# Patient Record
Sex: MALE | Race: ASIAN | NOT HISPANIC OR LATINO | URBAN - METROPOLITAN AREA
[De-identification: names, ages, dates, MRNs, and addresses within clinical notes are randomized per-mention and may not be internally consistent; named-entity substitution may affect disease eponyms.]

---

## 2017-09-01 ENCOUNTER — OUTPATIENT (OUTPATIENT)
Dept: OUTPATIENT SERVICES | Facility: HOSPITAL | Age: 42
LOS: 1 days | End: 2017-09-01
Payer: MEDICAID

## 2017-09-01 PROCEDURE — G9001: CPT

## 2017-09-17 ENCOUNTER — EMERGENCY (EMERGENCY)
Facility: HOSPITAL | Age: 42
LOS: 1 days | Discharge: HOME | End: 2017-09-17
Attending: EMERGENCY MEDICINE | Admitting: EMERGENCY MEDICINE
Payer: MEDICAID

## 2017-09-17 VITALS
SYSTOLIC BLOOD PRESSURE: 136 MMHG | HEART RATE: 89 BPM | DIASTOLIC BLOOD PRESSURE: 85 MMHG | TEMPERATURE: 99 F | RESPIRATION RATE: 18 BRPM | OXYGEN SATURATION: 99 %

## 2017-09-17 LAB
ALBUMIN SERPL ELPH-MCNC: 4.7 G/DL — SIGNIFICANT CHANGE UP (ref 3.3–5)
ALP SERPL-CCNC: 58 U/L — SIGNIFICANT CHANGE UP (ref 40–120)
ALT FLD-CCNC: 55 U/L — HIGH (ref 4–41)
APAP SERPL-MCNC: < 15 UG/ML — LOW (ref 15–25)
APPEARANCE UR: CLEAR — SIGNIFICANT CHANGE UP
AST SERPL-CCNC: 26 U/L — SIGNIFICANT CHANGE UP (ref 4–40)
BACTERIA # UR AUTO: SIGNIFICANT CHANGE UP
BARBITURATES MEASUREMENT: NEGATIVE — SIGNIFICANT CHANGE UP
BASE EXCESS BLDV CALC-SCNC: 3.1 MMOL/L — SIGNIFICANT CHANGE UP
BASOPHILS # BLD AUTO: 0.04 K/UL — SIGNIFICANT CHANGE UP (ref 0–0.2)
BASOPHILS NFR BLD AUTO: 0.5 % — SIGNIFICANT CHANGE UP (ref 0–2)
BENZODIAZ SERPL-MCNC: NEGATIVE — SIGNIFICANT CHANGE UP
BILIRUB SERPL-MCNC: 0.2 MG/DL — SIGNIFICANT CHANGE UP (ref 0.2–1.2)
BILIRUB UR-MCNC: NEGATIVE — SIGNIFICANT CHANGE UP
BLOOD GAS VENOUS - CREATININE: 0.86 MG/DL — SIGNIFICANT CHANGE UP (ref 0.5–1.3)
BLOOD UR QL VISUAL: NEGATIVE — SIGNIFICANT CHANGE UP
BUN SERPL-MCNC: 9 MG/DL — SIGNIFICANT CHANGE UP (ref 7–23)
CALCIUM SERPL-MCNC: 9.4 MG/DL — SIGNIFICANT CHANGE UP (ref 8.4–10.5)
CHLORIDE BLDV-SCNC: 105 MMOL/L — SIGNIFICANT CHANGE UP (ref 96–108)
CHLORIDE SERPL-SCNC: 102 MMOL/L — SIGNIFICANT CHANGE UP (ref 98–107)
CO2 SERPL-SCNC: 27 MMOL/L — SIGNIFICANT CHANGE UP (ref 22–31)
COLOR SPEC: SIGNIFICANT CHANGE UP
CREAT SERPL-MCNC: 0.91 MG/DL — SIGNIFICANT CHANGE UP (ref 0.5–1.3)
EOSINOPHIL # BLD AUTO: 0.37 K/UL — SIGNIFICANT CHANGE UP (ref 0–0.5)
EOSINOPHIL NFR BLD AUTO: 4.3 % — SIGNIFICANT CHANGE UP (ref 0–6)
ETHANOL BLD-MCNC: < 10 MG/DL — SIGNIFICANT CHANGE UP
GAS PNL BLDV: 140 MMOL/L — SIGNIFICANT CHANGE UP (ref 136–146)
GLUCOSE BLDV-MCNC: 105 — HIGH (ref 70–99)
GLUCOSE SERPL-MCNC: 98 MG/DL — SIGNIFICANT CHANGE UP (ref 70–99)
GLUCOSE UR-MCNC: NEGATIVE — SIGNIFICANT CHANGE UP
HCO3 BLDV-SCNC: 25 MMOL/L — SIGNIFICANT CHANGE UP (ref 20–27)
HCT VFR BLD CALC: 41.2 % — SIGNIFICANT CHANGE UP (ref 39–50)
HCT VFR BLDV CALC: 46.3 % — SIGNIFICANT CHANGE UP (ref 39–51)
HGB BLD-MCNC: 14.5 G/DL — SIGNIFICANT CHANGE UP (ref 13–17)
HGB BLDV-MCNC: 15.1 G/DL — SIGNIFICANT CHANGE UP (ref 13–17)
IMM GRANULOCYTES # BLD AUTO: 0.01 # — SIGNIFICANT CHANGE UP
IMM GRANULOCYTES NFR BLD AUTO: 0.1 % — SIGNIFICANT CHANGE UP (ref 0–1.5)
KETONES UR-MCNC: NEGATIVE — SIGNIFICANT CHANGE UP
LACTATE BLDV-MCNC: 1.4 MMOL/L — SIGNIFICANT CHANGE UP (ref 0.5–2)
LEUKOCYTE ESTERASE UR-ACNC: NEGATIVE — SIGNIFICANT CHANGE UP
LYMPHOCYTES # BLD AUTO: 4.02 K/UL — HIGH (ref 1–3.3)
LYMPHOCYTES # BLD AUTO: 47.2 % — HIGH (ref 13–44)
MCHC RBC-ENTMCNC: 31 PG — SIGNIFICANT CHANGE UP (ref 27–34)
MCHC RBC-ENTMCNC: 35.2 % — SIGNIFICANT CHANGE UP (ref 32–36)
MCV RBC AUTO: 88 FL — SIGNIFICANT CHANGE UP (ref 80–100)
MONOCYTES # BLD AUTO: 0.55 K/UL — SIGNIFICANT CHANGE UP (ref 0–0.9)
MONOCYTES NFR BLD AUTO: 6.5 % — SIGNIFICANT CHANGE UP (ref 2–14)
NEUTROPHILS # BLD AUTO: 3.53 K/UL — SIGNIFICANT CHANGE UP (ref 1.8–7.4)
NEUTROPHILS NFR BLD AUTO: 41.4 % — LOW (ref 43–77)
NITRITE UR-MCNC: NEGATIVE — SIGNIFICANT CHANGE UP
NRBC # FLD: 0 — SIGNIFICANT CHANGE UP
PCO2 BLDV: 52 MMHG — HIGH (ref 41–51)
PH BLDV: 7.36 PH — SIGNIFICANT CHANGE UP (ref 7.32–7.43)
PH UR: 7 — SIGNIFICANT CHANGE UP (ref 4.6–8)
PLATELET # BLD AUTO: 218 K/UL — SIGNIFICANT CHANGE UP (ref 150–400)
PMV BLD: 10.8 FL — SIGNIFICANT CHANGE UP (ref 7–13)
PO2 BLDV: 35 MMHG — SIGNIFICANT CHANGE UP (ref 35–40)
POTASSIUM BLDV-SCNC: 4.1 MMOL/L — SIGNIFICANT CHANGE UP (ref 3.4–4.5)
POTASSIUM SERPL-MCNC: 4.2 MMOL/L — SIGNIFICANT CHANGE UP (ref 3.5–5.3)
POTASSIUM SERPL-SCNC: 4.2 MMOL/L — SIGNIFICANT CHANGE UP (ref 3.5–5.3)
PROT SERPL-MCNC: 7.3 G/DL — SIGNIFICANT CHANGE UP (ref 6–8.3)
PROT UR-MCNC: NEGATIVE — SIGNIFICANT CHANGE UP
RBC # BLD: 4.68 M/UL — SIGNIFICANT CHANGE UP (ref 4.2–5.8)
RBC # FLD: 11.9 % — SIGNIFICANT CHANGE UP (ref 10.3–14.5)
SALICYLATES SERPL-MCNC: < 5 MG/DL — LOW (ref 15–30)
SAO2 % BLDV: 63.2 % — SIGNIFICANT CHANGE UP (ref 60–85)
SODIUM SERPL-SCNC: 141 MMOL/L — SIGNIFICANT CHANGE UP (ref 135–145)
SP GR SPEC: 1 — LOW (ref 1–1.03)
TSH SERPL-MCNC: 2.02 UIU/ML — SIGNIFICANT CHANGE UP (ref 0.27–4.2)
UROBILINOGEN FLD QL: NORMAL E.U. — SIGNIFICANT CHANGE UP (ref 0.1–0.2)
WBC # BLD: 8.52 K/UL — SIGNIFICANT CHANGE UP (ref 3.8–10.5)
WBC # FLD AUTO: 8.52 K/UL — SIGNIFICANT CHANGE UP (ref 3.8–10.5)

## 2017-09-17 PROCEDURE — 99285 EMERGENCY DEPT VISIT HI MDM: CPT

## 2017-09-17 RX ORDER — MECLIZINE HCL 12.5 MG
25 TABLET ORAL ONCE
Qty: 0 | Refills: 0 | Status: COMPLETED | OUTPATIENT
Start: 2017-09-17 | End: 2017-09-17

## 2017-09-17 RX ORDER — SODIUM CHLORIDE 9 MG/ML
1000 INJECTION INTRAMUSCULAR; INTRAVENOUS; SUBCUTANEOUS ONCE
Qty: 0 | Refills: 0 | Status: COMPLETED | OUTPATIENT
Start: 2017-09-17 | End: 2017-09-17

## 2017-09-17 RX ORDER — MECLIZINE HCL 12.5 MG
1 TABLET ORAL
Qty: 30 | Refills: 0
Start: 2017-09-17

## 2017-09-17 RX ADMIN — SODIUM CHLORIDE 2000 MILLILITER(S): 9 INJECTION INTRAMUSCULAR; INTRAVENOUS; SUBCUTANEOUS at 21:53

## 2017-09-17 RX ADMIN — Medication 25 MILLIGRAM(S): at 22:24

## 2017-09-17 RX ADMIN — SODIUM CHLORIDE 2000 MILLILITER(S): 9 INJECTION INTRAMUSCULAR; INTRAVENOUS; SUBCUTANEOUS at 21:52

## 2017-09-17 NOTE — ED PROVIDER NOTE - OBJECTIVE STATEMENT
21:54 Amalia att: 21:54 Amalia att: 42M h/o hld, dm c/o weak and head spinning x 2 wks. One month ago patient self-discontinued his metformin FS . Past two weeks generalized weakness, max temp 98F, sneezing, rhinorrhea, generalized shakiness. Yesterday room spinning, bilateral ear buzzing sound. Today head "spinning too much inside...all my body leg and hand no energy to hold up. I felt paralyzed." Assoc diaphoresis, unsteady gait. Denies cp, sob, exertional sx.  PMH hld, dm, "either lung or kidney infection from eating a lot of mangoes" PSH x MED [facility] 21:54 Amalia att: 42M h/o hld, dm c/o weak and head spinning x 2 wks. One month ago patient self-discontinued his metformin FS . Past two weeks generalized weakness, max temp 98F, sneezing, rhinorrhea, generalized shakiness. Yesterday room spinning, bilateral ear buzzing sound. Today head "spinning too much inside...all my body leg and hand no energy to hold up. I felt paralyzed." Assoc diaphoresis, unsteady gait. Denies cp, sob, exertional sx.  PMH hld, dm, "either lung or kidney infection from eating a lot of mangoes" PSH x MED metformin 500 mg, lisinopril 2.5mg, asa 81 qd, vit D, b12, calcium ALL nkda SMOKE occ PCP [facility] [id number] 21:54 Amalia att: 42M h/o hld, dm c/o weak and head spinning x 2 wks. One month ago patient self-discontinued his metformin FS . Past two weeks generalized weakness, max temp 98F, sneezing, rhinorrhea, generalized shakiness. Yesterday room spinning, bilateral ear buzzing sound. Today head "spinning too much inside...all my body leg and hand no energy to hold up. I felt paralyzed." Assoc diaphoresis, unsteady gait. Denies cp, sob, exertional sx.  PMH hld, dm, "either lung or kidney infection from eating a lot of mangoes" PSH x MED metformin 500 mg, lisinopril 2.5mg, asa 81 qd, vit D, b12, calcium ALL nkda SMOKE occ PCP [facility] [id number]    22:11 Seamus PA: 41 y/o M w/ PMHx of htn, hld, dm (on metformin), GERD, presents to ED c/o dizziness, generalized fatigue and chills x1wk. States no documented fever at home but was taking Tylenol around the clock for the past x3days. States his PMD told him to hold on his home medications in case he starts feeling ill. Also c/o ringing in the lateral ears. States he works as an [employer]  and notices dizziness more when turning his head while driving. Sometimes feels he is about to get pushed backwards. Denies SOB, CP, cough, nausea, vomiting, abdominal pain, dysuria, diarrhea, or any other complaints. 21:54 Amalia att: 42M h/o hld, dm c/o weak and head spinning x 2 wks. One month ago patient self-discontinued his metformin FS . Past two weeks generalized weakness, max temp 98F, sneezing, rhinorrhea, generalized shakiness. Yesterday room spinning, bilateral ear buzzing sound. Today head "spinning too much inside...all my body leg and hand no energy to hold up. I felt paralyzed." Assoc diaphoresis, unsteady gait. Denies cp, sob, exertional sx.  PMH hld, dm, "either lung or kidney infection from eating a lot of mangoes" PSH x MED metformin 500 mg, lisinopril 2.5mg, asa 81 qd, vit D, b12, calcium ALL nkda SMOKE occ PCP [facility] [id number]    MEHUL Goldbergov: 43 y/o M w/ PMHx of htn, hld, dm (on metformin), GERD, presents to ED c/o dizziness, generalized fatigue and chills x1wk. States no documented fever at home but was taking Tylenol around the clock for the past x3days. Also c/o ringing in bilateral ears. States he works as an [employer]  and notices dizziness more when turning his head while driving. Sometimes feels he is about to get pushed backwards. Denies SOB, CP, cough, nausea, vomiting, abdominal pain, dysuria, diarrhea, or any other complaints.

## 2017-09-17 NOTE — ED PROVIDER NOTE - PHYSICAL EXAMINATION
Amalia att: nad, aaox3, perrl, cn2-12 intact, ncat, neck supple, 80% perforation of left TM with no active drainage or redness of ear canal, ctabl, rrr, s1s2, abd soft ntnd. 2 Beats horizontal nystagmus elicited with rightward gaze, not reproduced on further exam. Nl finger nose. Nl rapid alt movement. Nl heel shin. Nl gait steady and unassisted. Neg romberg. Amalia att: nad, aaox3, perrl, cn2-12 intact, ncat, neck supple, 80% perforation of left TM with no active drainage or redness of ear canal, ctabl, rrr, s1s2, abd soft ntnd. 2 Beats horizontal nystagmus elicited with rightward gaze, not reproduced on further exam. Nl finger nose. Nl rapid alt movement. Nl heel shin. Nl gait steady and unassisted. Neg romberg.    Neg [place] Hallpike

## 2017-09-17 NOTE — ED PROVIDER NOTE - ATTENDING CONTRIBUTION TO CARE
Dr. Holland: I performed a face to face bedside interview with patient regarding history of present illness, review of symptoms and past medical history. I completed an independent physical exam.  I have discussed patient's plan of care with PA.   I agree with note as stated above, having amended the EMR as needed to reflect my findings.   This includes HISTORY OF PRESENT ILLNESS, HIV, PAST MEDICAL/SURGICAL/FAMILY/SOCIAL HISTORY, ALLERGIES AND HOME MEDICATIONS, REVIEW OF SYSTEMS, PHYSICAL EXAM, and any PROGRESS NOTES during the time I functioned as the attending physician for this patient. HPI above as by me. PE above as by me. Generalized weakness plus ?vertigo. PE intact cranial and cerebellar nerves, ruptured lt TM. PLAN meclizine, fluids, cbc diff r/o anemia, cmp eval for electrolyte disturbance, tsh, ua, ucx, vbg eval for dka

## 2017-09-17 NOTE — ED PROVIDER NOTE - PMH
No pertinent past medical history DM (diabetes mellitus)    HLD (hyperlipidemia)    HTN (hypertension)

## 2017-09-17 NOTE — ED PROVIDER NOTE - PROGRESS NOTE DETAILS
MEHUL Way: Pt reassessed, feeling better, improved after Meclizine. Labs wnl. Will dc w/ ENT follow up

## 2017-09-17 NOTE — ED PROVIDER NOTE - PLAN OF CARE
See your primary care doctor within 24-48 hours. See an ENT doctor within the week (referral list provided). Take Meclizine 25mg every 8 hours as needed for dizziness. DO NOT USE Q-TIPS OR PUT WATER INTO YOUR EAR. If symptoms worsen or if you develop vision changes, have extremity numbness/tingling or any other concerns, return to the ER.

## 2017-09-17 NOTE — ED PROVIDER NOTE - CARE PLAN
Principal Discharge DX:	Vertigo  Instructions for follow-up, activity and diet:	See your primary care doctor within 24-48 hours. See an ENT doctor within the week (referral list provided). Take Meclizine 25mg every 8 hours as needed for dizziness. DO NOT USE Q-TIPS OR PUT WATER INTO YOUR EAR. If symptoms worsen or if you develop vision changes, have extremity numbness/tingling or any other concerns, return to the ER.  Secondary Diagnosis:	Rupture of tympanic membrane, left

## 2017-09-17 NOTE — ED ADULT TRIAGE NOTE - CHIEF COMPLAINT QUOTE
Patient  has c/o his whole body is shaking, head is spinning, feeling feverish, itchy throat. Theses symptoms started a couple of days ago.

## 2017-09-17 NOTE — ED PROVIDER NOTE - NEUROLOGICAL, MLM
Alert and oriented, no focal deficits, no motor or sensory deficits. Negative Gor[place] Hallpike maneuver.

## 2017-09-17 NOTE — ED PROVIDER NOTE - MEDICAL DECISION MAKING DETAILS
43 y/o M w/ generalized fatigue and malaise, nonfocal exam findings, differential dx questionable labyrinthitis secondary to recent viral URI and vertigo now, due to absence of horizontal gaze nystagmus and negative [place] Hallpike will consider CT head. 41 y/o M w/ generalized fatigue and malaise w/ vertigo,  perforated L TM likely the cause of pts vertigo. Will do labs and trail meclizine

## 2017-09-19 DIAGNOSIS — R69 ILLNESS, UNSPECIFIED: ICD-10-CM

## 2017-09-19 LAB
BACTERIA UR CULT: SIGNIFICANT CHANGE UP
SPECIMEN SOURCE: SIGNIFICANT CHANGE UP

## 2017-12-03 ENCOUNTER — EMERGENCY (EMERGENCY)
Facility: HOSPITAL | Age: 42
LOS: 1 days | Discharge: HOME | End: 2017-12-03
Admitting: EMERGENCY MEDICINE
Payer: COMMERCIAL

## 2017-12-03 VITALS
RESPIRATION RATE: 16 BRPM | TEMPERATURE: 98 F | DIASTOLIC BLOOD PRESSURE: 95 MMHG | HEART RATE: 97 BPM | SYSTOLIC BLOOD PRESSURE: 153 MMHG | OXYGEN SATURATION: 100 %

## 2017-12-03 PROCEDURE — 99285 EMERGENCY DEPT VISIT HI MDM: CPT

## 2017-12-03 RX ORDER — ACETAMINOPHEN 500 MG
650 TABLET ORAL ONCE
Qty: 0 | Refills: 0 | Status: COMPLETED | OUTPATIENT
Start: 2017-12-03 | End: 2017-12-03

## 2017-12-03 RX ORDER — IBUPROFEN 200 MG
600 TABLET ORAL ONCE
Qty: 0 | Refills: 0 | Status: COMPLETED | OUTPATIENT
Start: 2017-12-03 | End: 2017-12-03

## 2017-12-03 RX ADMIN — Medication 650 MILLIGRAM(S): at 23:40

## 2017-12-03 RX ADMIN — Medication 600 MILLIGRAM(S): at 23:40

## 2017-12-03 NOTE — ED PROVIDER NOTE - PLAN OF CARE
Follow with your PMD within 48-72 hours.  Rest, no heavy lifting.  Warm compresses to area. Light walking. Take Motrin 600 mg every 8 hours for pain with food, Valium 5mg every 8 hours as needed for muscle spasm- caution drowsiness/do not drive. Any worsening pain, weakness, numbness, bowel or urinary incontinence return to ER.
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
## 2017-12-03 NOTE — ED PROVIDER NOTE - PROGRESS NOTE DETAILS
MEHUL Soto: xray negative. pt pain improved but still has some pain. Pt walking without difficulty. Pt to be dced with valium and Motrin.
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
## 2017-12-03 NOTE — ED PROVIDER NOTE - CHPI ED SYMPTOMS NEG
no laceration/no dizziness/no disorientation/no loss of consciousness/no bruising/no difficulty bearing weight

## 2017-12-04 PROCEDURE — 72100 X-RAY EXAM L-S SPINE 2/3 VWS: CPT | Mod: 26

## 2017-12-04 RX ORDER — DIAZEPAM 5 MG
1 TABLET ORAL
Qty: 9 | Refills: 0
Start: 2017-12-04 | End: 2017-12-07

## 2017-12-04 NOTE — ED ADULT NURSE REASSESSMENT NOTE - NS ED NURSE REASSESS COMMENT FT1
Pt admitted to surgery, no bed assignment at this time, pt endorsed to TREVON Welsh in intake in stable condition for continuity of care.

## 2019-07-01 ENCOUNTER — OUTPATIENT (OUTPATIENT)
Dept: OUTPATIENT SERVICES | Facility: HOSPITAL | Age: 44
LOS: 1 days | End: 2019-07-01
Payer: MEDICAID

## 2019-07-01 PROCEDURE — G9001: CPT

## 2019-07-23 ENCOUNTER — EMERGENCY (EMERGENCY)
Facility: HOSPITAL | Age: 44
LOS: 1 days | Discharge: HOME | End: 2019-07-23
Attending: EMERGENCY MEDICINE | Admitting: EMERGENCY MEDICINE
Payer: MEDICAID

## 2019-07-23 VITALS
RESPIRATION RATE: 16 BRPM | SYSTOLIC BLOOD PRESSURE: 120 MMHG | OXYGEN SATURATION: 100 % | HEART RATE: 65 BPM | DIASTOLIC BLOOD PRESSURE: 57 MMHG

## 2019-07-23 VITALS
DIASTOLIC BLOOD PRESSURE: 95 MMHG | SYSTOLIC BLOOD PRESSURE: 154 MMHG | HEART RATE: 89 BPM | TEMPERATURE: 98 F | OXYGEN SATURATION: 100 %

## 2019-07-23 PROBLEM — E78.5 HYPERLIPIDEMIA, UNSPECIFIED: Chronic | Status: ACTIVE | Noted: 2017-09-17

## 2019-07-23 PROBLEM — E11.9 TYPE 2 DIABETES MELLITUS WITHOUT COMPLICATIONS: Chronic | Status: ACTIVE | Noted: 2017-09-17

## 2019-07-23 PROBLEM — I10 ESSENTIAL (PRIMARY) HYPERTENSION: Chronic | Status: ACTIVE | Noted: 2017-09-17

## 2019-07-23 PROCEDURE — 73110 X-RAY EXAM OF WRIST: CPT | Mod: 26,LT,RT

## 2019-07-23 PROCEDURE — 99283 EMERGENCY DEPT VISIT LOW MDM: CPT

## 2019-07-23 RX ORDER — ACETAMINOPHEN 500 MG
650 TABLET ORAL ONCE
Refills: 0 | Status: COMPLETED | OUTPATIENT
Start: 2019-07-23 | End: 2019-07-23

## 2019-07-23 RX ORDER — IBUPROFEN 200 MG
1 TABLET ORAL
Qty: 28 | Refills: 0
Start: 2019-07-23 | End: 2019-07-29

## 2019-07-23 RX ORDER — IBUPROFEN 200 MG
600 TABLET ORAL ONCE
Refills: 0 | Status: COMPLETED | OUTPATIENT
Start: 2019-07-23 | End: 2019-07-23

## 2019-07-23 RX ADMIN — Medication 600 MILLIGRAM(S): at 13:39

## 2019-07-23 RX ADMIN — Medication 650 MILLIGRAM(S): at 13:39
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
## 2019-07-23 NOTE — ED PROVIDER NOTE - NSFOLLOWUPINSTRUCTIONS_ED_ALL_ED_FT
-Follow-up with your Primary Care Doctor in 1-2 days.  -Return to the Emergency Department for any worsening or concerning symptoms such as fevers, severe pain, trouble breathing, weakness or lightheadedness.  You can take 650mg of acetaminophen every 4-6hrs as needed for pain. Do not take more than 3250mg per day.   Can use 400mg Ibuprofen every 4 to 6 hours for pain control as needed. Do not take more than 3200mg per day. Take ibuprofen with food. Review all warning labels before taking.

## 2019-07-23 NOTE — ED PROVIDER NOTE - PROGRESS NOTE DETAILS
Xrs negative for fx. All results d/w patient and copies given with instructions to bring with them to their follow up appointment.  The patient was given verbal and written discharge instructions Specifically, instructions when to return to the ED and to seek follow-up from their pcp within 1-2 days. The patient understands that should their symptoms worsen or any new symptoms arise, they should return to the ED immediately for further evaluation.  Vss, NAD, tolerating po and ambulating steadily at discharge.
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
## 2019-07-23 NOTE — ED PROVIDER NOTE - NSFOLLOWUPCLINICS_GEN_ALL_ED_FT
[facility] Orthopedic Surgery  Orthopedic Surgery  [address], 3rd & 4th [place] [address]  Phone: [phone]  Fax:   Follow Up Time:

## 2019-07-23 NOTE — ED PROVIDER NOTE - PHYSICAL EXAMINATION
Gen: No acute distress, alert, cooperative  HENT: Normocephalic, atraumatic.   Eyes: PERRL, EOMI    Resp: CTAB, non-labored, speaking without difficulty on room air, no wheeze  CV: rrr, no murmur  Abd: soft, NTND, no rebound or guarding  MSK: No CVAT bilaterally, no midline ttp, mild soreness SHA paraspinal neck, no pain with neck rom, tender SHA dorsal wrist in the middle. No snuffbox tenderness SHA, no pain with thumb grinding SHA, no finger tenderness, no MCP tenderness SHA. NOrmal rom throughout  Skin: warm and dry  Neuro: AOx3, speech is fluent and appropriate, no focal deficits, normal gait.   Psych: Normal affect

## 2019-07-24 DIAGNOSIS — Z71.89 OTHER SPECIFIED COUNSELING: ICD-10-CM

## 2019-11-25 ENCOUNTER — EMERGENCY (EMERGENCY)
Facility: HOSPITAL | Age: 44
LOS: 1 days | Discharge: HOME | End: 2019-11-25
Admitting: EMERGENCY MEDICINE
Payer: COMMERCIAL

## 2019-11-25 VITALS
RESPIRATION RATE: 18 BRPM | HEART RATE: 68 BPM | TEMPERATURE: 98 F | DIASTOLIC BLOOD PRESSURE: 78 MMHG | OXYGEN SATURATION: 98 % | SYSTOLIC BLOOD PRESSURE: 117 MMHG

## 2019-11-25 VITALS
RESPIRATION RATE: 16 BRPM | HEART RATE: 78 BPM | DIASTOLIC BLOOD PRESSURE: 83 MMHG | OXYGEN SATURATION: 98 % | SYSTOLIC BLOOD PRESSURE: 119 MMHG | TEMPERATURE: 98 F

## 2019-11-25 PROCEDURE — 70450 CT HEAD/BRAIN W/O DYE: CPT | Mod: 26

## 2019-11-25 PROCEDURE — 99284 EMERGENCY DEPT VISIT MOD MDM: CPT

## 2019-11-25 RX ORDER — LIDOCAINE 4 G/100G
2 CREAM TOPICAL ONCE
Refills: 0 | Status: COMPLETED | OUTPATIENT
Start: 2019-11-25 | End: 2019-11-25

## 2019-11-25 RX ORDER — KETOROLAC TROMETHAMINE 30 MG/ML
30 SYRINGE (ML) INJECTION ONCE
Refills: 0 | Status: DISCONTINUED | OUTPATIENT
Start: 2019-11-25 | End: 2019-11-25

## 2019-11-25 RX ADMIN — Medication 30 MILLIGRAM(S): at 19:48

## 2019-11-25 RX ADMIN — LIDOCAINE 2 PATCH: 4 CREAM TOPICAL at 19:48

## 2019-11-25 NOTE — ED PROVIDER NOTE - PATIENT PORTAL LINK FT
You can access the FollowMyHealth Patient Portal offered by [facility] by registering at the following website: [email or web address]. By joining [facility]’s FollowMyHealth portal, you will also be able to view your health information using other applications (apps) compatible with our system.
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
## 2022-07-29 ENCOUNTER — INPATIENT (INPATIENT)
Facility: HOSPITAL | Age: 47
LOS: 0 days | Discharge: HOME | End: 2022-07-30
Attending: STUDENT IN AN ORGANIZED HEALTH CARE EDUCATION/TRAINING PROGRAM | Admitting: STUDENT IN AN ORGANIZED HEALTH CARE EDUCATION/TRAINING PROGRAM

## 2022-07-29 VITALS
HEART RATE: 81 BPM | DIASTOLIC BLOOD PRESSURE: 72 MMHG | SYSTOLIC BLOOD PRESSURE: 107 MMHG | RESPIRATION RATE: 16 BRPM | TEMPERATURE: 97 F | OXYGEN SATURATION: 100 %

## 2022-07-29 LAB
ALBUMIN SERPL ELPH-MCNC: 4.5 G/DL — SIGNIFICANT CHANGE UP (ref 3.3–5)
ALP SERPL-CCNC: 53 U/L — SIGNIFICANT CHANGE UP (ref 40–120)
ALT FLD-CCNC: 12 U/L — SIGNIFICANT CHANGE UP (ref 4–41)
ANION GAP SERPL CALC-SCNC: 11 MMOL/L — SIGNIFICANT CHANGE UP (ref 7–14)
APTT BLD: 31.4 SEC — SIGNIFICANT CHANGE UP (ref 27–36.3)
AST SERPL-CCNC: 16 U/L — SIGNIFICANT CHANGE UP (ref 4–40)
BASOPHILS # BLD AUTO: 0.04 K/UL — SIGNIFICANT CHANGE UP (ref 0–0.2)
BASOPHILS NFR BLD AUTO: 0.3 % — SIGNIFICANT CHANGE UP (ref 0–2)
BILIRUB SERPL-MCNC: 0.4 MG/DL — SIGNIFICANT CHANGE UP (ref 0.2–1.2)
BUN SERPL-MCNC: 10 MG/DL — SIGNIFICANT CHANGE UP (ref 7–23)
CALCIUM SERPL-MCNC: 9 MG/DL — SIGNIFICANT CHANGE UP (ref 8.4–10.5)
CHLORIDE SERPL-SCNC: 100 MMOL/L — SIGNIFICANT CHANGE UP (ref 98–107)
CO2 SERPL-SCNC: 26 MMOL/L — SIGNIFICANT CHANGE UP (ref 22–31)
CREAT SERPL-MCNC: 0.89 MG/DL — SIGNIFICANT CHANGE UP (ref 0.5–1.3)
CRP SERPL-MCNC: <3 MG/L — SIGNIFICANT CHANGE UP
EGFR: 108 ML/MIN/1.73M2 — SIGNIFICANT CHANGE UP
EOSINOPHIL # BLD AUTO: 0.19 K/UL — SIGNIFICANT CHANGE UP (ref 0–0.5)
EOSINOPHIL NFR BLD AUTO: 1.6 % — SIGNIFICANT CHANGE UP (ref 0–6)
ERYTHROCYTE [SEDIMENTATION RATE] IN BLOOD: 5 MM/HR — SIGNIFICANT CHANGE UP (ref 1–15)
FLUAV AG NPH QL: SIGNIFICANT CHANGE UP
FLUBV AG NPH QL: SIGNIFICANT CHANGE UP
GLUCOSE SERPL-MCNC: 88 MG/DL — SIGNIFICANT CHANGE UP (ref 70–99)
HCT VFR BLD CALC: 40.6 % — SIGNIFICANT CHANGE UP (ref 39–50)
HGB BLD-MCNC: 13.8 G/DL — SIGNIFICANT CHANGE UP (ref 13–17)
IANC: 8.7 K/UL — HIGH (ref 1.8–7.4)
IMM GRANULOCYTES NFR BLD AUTO: 0.3 % — SIGNIFICANT CHANGE UP (ref 0–1.5)
INR BLD: 1.01 RATIO — SIGNIFICANT CHANGE UP (ref 0.88–1.16)
LYMPHOCYTES # BLD AUTO: 18.9 % — SIGNIFICANT CHANGE UP (ref 13–44)
LYMPHOCYTES # BLD AUTO: 2.25 K/UL — SIGNIFICANT CHANGE UP (ref 1–3.3)
MAGNESIUM SERPL-MCNC: 1.9 MG/DL — SIGNIFICANT CHANGE UP (ref 1.6–2.6)
MCHC RBC-ENTMCNC: 30.7 PG — SIGNIFICANT CHANGE UP (ref 27–34)
MCHC RBC-ENTMCNC: 34 GM/DL — SIGNIFICANT CHANGE UP (ref 32–36)
MCV RBC AUTO: 90.2 FL — SIGNIFICANT CHANGE UP (ref 80–100)
MONOCYTES # BLD AUTO: 0.69 K/UL — SIGNIFICANT CHANGE UP (ref 0–0.9)
MONOCYTES NFR BLD AUTO: 5.8 % — SIGNIFICANT CHANGE UP (ref 2–14)
NEUTROPHILS # BLD AUTO: 8.7 K/UL — HIGH (ref 1.8–7.4)
NEUTROPHILS NFR BLD AUTO: 73.1 % — SIGNIFICANT CHANGE UP (ref 43–77)
NRBC # BLD: 0 /100 WBCS — SIGNIFICANT CHANGE UP
NRBC # FLD: 0 K/UL — SIGNIFICANT CHANGE UP
NT-PROBNP SERPL-SCNC: <5 PG/ML — SIGNIFICANT CHANGE UP
PLATELET # BLD AUTO: 197 K/UL — SIGNIFICANT CHANGE UP (ref 150–400)
POTASSIUM SERPL-MCNC: 3.8 MMOL/L — SIGNIFICANT CHANGE UP (ref 3.5–5.3)
POTASSIUM SERPL-SCNC: 3.8 MMOL/L — SIGNIFICANT CHANGE UP (ref 3.5–5.3)
PROCALCITONIN SERPL-MCNC: 0.05 NG/ML — SIGNIFICANT CHANGE UP (ref 0.02–0.1)
PROT SERPL-MCNC: 7 G/DL — SIGNIFICANT CHANGE UP (ref 6–8.3)
PROTHROM AB SERPL-ACNC: 11.7 SEC — SIGNIFICANT CHANGE UP (ref 10.5–13.4)
RBC # BLD: 4.5 M/UL — SIGNIFICANT CHANGE UP (ref 4.2–5.8)
RBC # FLD: 12 % — SIGNIFICANT CHANGE UP (ref 10.3–14.5)
RSV RNA NPH QL NAA+NON-PROBE: SIGNIFICANT CHANGE UP
SARS-COV-2 RNA SPEC QL NAA+PROBE: SIGNIFICANT CHANGE UP
SODIUM SERPL-SCNC: 137 MMOL/L — SIGNIFICANT CHANGE UP (ref 135–145)
TROPONIN T, HIGH SENSITIVITY RESULT: <6 NG/L — SIGNIFICANT CHANGE UP
WBC # BLD: 11.91 K/UL — HIGH (ref 3.8–10.5)
WBC # FLD AUTO: 11.91 K/UL — HIGH (ref 3.8–10.5)

## 2022-07-29 PROCEDURE — 99285 EMERGENCY DEPT VISIT HI MDM: CPT

## 2022-07-29 PROCEDURE — 70496 CT ANGIOGRAPHY HEAD: CPT | Mod: 26,MA

## 2022-07-29 PROCEDURE — 70498 CT ANGIOGRAPHY NECK: CPT | Mod: 26,MA

## 2022-07-29 PROCEDURE — 71046 X-RAY EXAM CHEST 2 VIEWS: CPT | Mod: 26

## 2022-07-29 RX ORDER — MORPHINE SULFATE 50 MG/1
4 CAPSULE, EXTENDED RELEASE ORAL ONCE
Refills: 0 | Status: DISCONTINUED | OUTPATIENT
Start: 2022-07-29 | End: 2022-07-29

## 2022-07-29 RX ORDER — METOCLOPRAMIDE HCL 10 MG
10 TABLET ORAL ONCE
Refills: 0 | Status: COMPLETED | OUTPATIENT
Start: 2022-07-29 | End: 2022-07-29

## 2022-07-29 RX ORDER — SODIUM CHLORIDE 9 MG/ML
1000 INJECTION INTRAMUSCULAR; INTRAVENOUS; SUBCUTANEOUS ONCE
Refills: 0 | Status: COMPLETED | OUTPATIENT
Start: 2022-07-29 | End: 2022-07-29

## 2022-07-29 RX ADMIN — MORPHINE SULFATE 4 MILLIGRAM(S): 50 CAPSULE, EXTENDED RELEASE ORAL at 22:04

## 2022-07-29 RX ADMIN — SODIUM CHLORIDE 1000 MILLILITER(S): 9 INJECTION INTRAMUSCULAR; INTRAVENOUS; SUBCUTANEOUS at 22:05

## 2022-07-29 RX ADMIN — Medication 10 MILLIGRAM(S): at 22:05

## 2022-07-29 NOTE — ED ADULT TRIAGE NOTE - CHIEF COMPLAINT QUOTE
Pt c/o CP and SOB beginning this afternoon. Pt respirations even and unlabored. Reports CP radiates to neck and head. PMHx: DM, FS:111.

## 2022-07-29 NOTE — ED ADULT NURSE NOTE - OBJECTIVE STATEMENT
Patient received with the complaints of chest pain and sob started this afternoon. As per patient, his pain is radiating to neck and head. Patient is placed on cardiac monitor. No distress noted. Nursing care continues

## 2022-07-29 NOTE — ED PROVIDER NOTE - OBJECTIVE STATEMENT
47 Y M H/O HTN, HCL, NIDDM, fhx early MI <55, presenting with chest pain. States pain began at ~16:00, mid chest radiating up to R. chest, Denies any nausea, vomiting, abdominal pain, back pain, difficulty breathing.

## 2022-07-29 NOTE — ED ADULT TRIAGE NOTE - RESPIRATORY RATE (BREATHS/MIN)
CT ABDOMEN AND PELVIS WITHOUT CONTRAST:

 

Date:  10/05/2020

 

HISTORY:  

33-year-old male with abdominal pain, mainly in the left lower quadrant. 

 

COMPARISON: 

None. 

 

FINDINGS:

Absence of oral and IV contrast reduces the sensitivity of exam, particularly for evaluation of solid
 organs and bowel. 

 

The lung bases are clear. No free air or free fluid is seen in the abdomen or pelvis. A small hiatal 
hernia is seen.  

 

Numerous cysts are seen in the liver, the largest measuring up to 3.0 cm in the right lobe. No calcif
ied gallstones are noted. 

 

There is a 3.0 cm calculus in the superior pole of the right kidney. There is a 4.0 mm calculus in th
e distal left ureter close to the UVJ with mild ureteral dilatation, but no hydronephrosis. No calcul
i seen in the left kidney, right ureter, or the urinary bladder. No hydroureteronephrosis seen on eit
her side. There is a 2.5 cm cyst in the left kidney. There are low density lesions in the right kidne
y with a 1.3 cm exophytic lesion arising from the inferior pole. These do not meet all criteria for a
 simple cyst.

 

IMPRESSION: 

1.  4.0 mm left distal ureteric calculus. 

 

2.  Nonobstructing 3.0 mm right renal calculus. 

 

3.  Left renal cyst. 

 

4.  Probable cysts in the right kidney. Confirmation with ultrasound of the abdomen is recommended. 

 

5.  Liver cysts. 

 

6.  Small hiatal hernia. 

 

 

POS: OFF 16

## 2022-07-29 NOTE — ED PROVIDER NOTE - CLINICAL SUMMARY MEDICAL DECISION MAKING FREE TEXT BOX
47 Y M H/O HTN, HCL, DM, FHx early MI, recent smoking, presenting with chest pain. Likely high risk chest pain, low clinical suspicion PE WELLS SCORE (no clinical signs of dvt, no PE#1 dx, no HR>100, no immob>3days/surg in last 4 wks, no prev DVT/PE, no hemoptysis, no malignancy), in setting of neck pain/temporal pain will work up for carotid dissection vs. temporal arteritis. common labs, trop, bnp, CTA neck/head, re-assess, likely admit for high risk chest pain.

## 2022-07-29 NOTE — ED PROVIDER NOTE - PROGRESS NOTE DETAILS
Ubaldo Rabago MD:  Trop negative, discussed with Dr. Falk, high risk chest pain admission to Tele for first workup with stress test, cardiology evaluation.

## 2022-07-29 NOTE — ED PROVIDER NOTE - ATTENDING CONTRIBUTION TO CARE
DR. EVERETT, ATTENDING MD-  I performed a face to face bedside interview with the patient regarding history of present illness, review of symptoms and past medical history. I completed an independent physical exam.  I have discussed the patient's plan of care with the resident.   Documentation as above in the note.    46 y/o male h/o smoking early fam h/o cardiac disease here with cp sob x3pm.  Additional c/o ha.  Eval for acs ptx vasc dissection.  Obtain cbc cmp trop ekg cta h/n cxr admit for further care and evaluation.

## 2022-07-30 ENCOUNTER — TRANSCRIPTION ENCOUNTER (OUTPATIENT)
Age: 47
End: 2022-07-30

## 2022-07-30 VITALS
HEART RATE: 67 BPM | RESPIRATION RATE: 15 BRPM | OXYGEN SATURATION: 98 % | TEMPERATURE: 98 F | SYSTOLIC BLOOD PRESSURE: 111 MMHG | DIASTOLIC BLOOD PRESSURE: 63 MMHG

## 2022-07-30 DIAGNOSIS — I10 ESSENTIAL (PRIMARY) HYPERTENSION: ICD-10-CM

## 2022-07-30 DIAGNOSIS — R07.9 CHEST PAIN, UNSPECIFIED: ICD-10-CM

## 2022-07-30 DIAGNOSIS — E78.5 HYPERLIPIDEMIA, UNSPECIFIED: ICD-10-CM

## 2022-07-30 DIAGNOSIS — E03.9 HYPOTHYROIDISM, UNSPECIFIED: ICD-10-CM

## 2022-07-30 DIAGNOSIS — E11.9 TYPE 2 DIABETES MELLITUS WITHOUT COMPLICATIONS: ICD-10-CM

## 2022-07-30 LAB
A1C WITH ESTIMATED AVERAGE GLUCOSE RESULT: 5.7 % — HIGH (ref 4–5.6)
ANION GAP SERPL CALC-SCNC: 10 MMOL/L — SIGNIFICANT CHANGE UP (ref 7–14)
BASOPHILS # BLD AUTO: 0.02 K/UL — SIGNIFICANT CHANGE UP (ref 0–0.2)
BASOPHILS NFR BLD AUTO: 0.2 % — SIGNIFICANT CHANGE UP (ref 0–2)
BUN SERPL-MCNC: 9 MG/DL — SIGNIFICANT CHANGE UP (ref 7–23)
CALCIUM SERPL-MCNC: 8.2 MG/DL — LOW (ref 8.4–10.5)
CHLORIDE SERPL-SCNC: 106 MMOL/L — SIGNIFICANT CHANGE UP (ref 98–107)
CHOLEST SERPL-MCNC: 169 MG/DL — SIGNIFICANT CHANGE UP
CO2 SERPL-SCNC: 25 MMOL/L — SIGNIFICANT CHANGE UP (ref 22–31)
CREAT SERPL-MCNC: 0.81 MG/DL — SIGNIFICANT CHANGE UP (ref 0.5–1.3)
D DIMER BLD IA.RAPID-MCNC: <150 NG/ML DDU — SIGNIFICANT CHANGE UP
EGFR: 111 ML/MIN/1.73M2 — SIGNIFICANT CHANGE UP
EOSINOPHIL # BLD AUTO: 0.21 K/UL — SIGNIFICANT CHANGE UP (ref 0–0.5)
EOSINOPHIL NFR BLD AUTO: 2.5 % — SIGNIFICANT CHANGE UP (ref 0–6)
ESTIMATED AVERAGE GLUCOSE: 117 — SIGNIFICANT CHANGE UP
GLUCOSE BLDC GLUCOMTR-MCNC: 176 MG/DL — HIGH (ref 70–99)
GLUCOSE BLDC GLUCOMTR-MCNC: 86 MG/DL — SIGNIFICANT CHANGE UP (ref 70–99)
GLUCOSE BLDC GLUCOMTR-MCNC: 95 MG/DL — SIGNIFICANT CHANGE UP (ref 70–99)
GLUCOSE BLDC GLUCOMTR-MCNC: 99 MG/DL — SIGNIFICANT CHANGE UP (ref 70–99)
GLUCOSE SERPL-MCNC: 94 MG/DL — SIGNIFICANT CHANGE UP (ref 70–99)
HCT VFR BLD CALC: 36.9 % — LOW (ref 39–50)
HDLC SERPL-MCNC: 40 MG/DL — LOW
HGB BLD-MCNC: 12.4 G/DL — LOW (ref 13–17)
IANC: 4.59 K/UL — SIGNIFICANT CHANGE UP (ref 1.8–7.4)
IMM GRANULOCYTES NFR BLD AUTO: 0.2 % — SIGNIFICANT CHANGE UP (ref 0–1.5)
LIPID PNL WITH DIRECT LDL SERPL: 109 MG/DL — HIGH
LYMPHOCYTES # BLD AUTO: 2.78 K/UL — SIGNIFICANT CHANGE UP (ref 1–3.3)
LYMPHOCYTES # BLD AUTO: 33.3 % — SIGNIFICANT CHANGE UP (ref 13–44)
MCHC RBC-ENTMCNC: 30.5 PG — SIGNIFICANT CHANGE UP (ref 27–34)
MCHC RBC-ENTMCNC: 33.6 GM/DL — SIGNIFICANT CHANGE UP (ref 32–36)
MCV RBC AUTO: 90.9 FL — SIGNIFICANT CHANGE UP (ref 80–100)
MONOCYTES # BLD AUTO: 0.72 K/UL — SIGNIFICANT CHANGE UP (ref 0–0.9)
MONOCYTES NFR BLD AUTO: 8.6 % — SIGNIFICANT CHANGE UP (ref 2–14)
NEUTROPHILS # BLD AUTO: 4.59 K/UL — SIGNIFICANT CHANGE UP (ref 1.8–7.4)
NEUTROPHILS NFR BLD AUTO: 55.2 % — SIGNIFICANT CHANGE UP (ref 43–77)
NON HDL CHOLESTEROL: 129 MG/DL — SIGNIFICANT CHANGE UP
NRBC # BLD: 0 /100 WBCS — SIGNIFICANT CHANGE UP
NRBC # FLD: 0 K/UL — SIGNIFICANT CHANGE UP
PLATELET # BLD AUTO: 177 K/UL — SIGNIFICANT CHANGE UP (ref 150–400)
POTASSIUM SERPL-MCNC: 3.8 MMOL/L — SIGNIFICANT CHANGE UP (ref 3.5–5.3)
POTASSIUM SERPL-SCNC: 3.8 MMOL/L — SIGNIFICANT CHANGE UP (ref 3.5–5.3)
RBC # BLD: 4.06 M/UL — LOW (ref 4.2–5.8)
RBC # FLD: 12 % — SIGNIFICANT CHANGE UP (ref 10.3–14.5)
SODIUM SERPL-SCNC: 141 MMOL/L — SIGNIFICANT CHANGE UP (ref 135–145)
TRIGL SERPL-MCNC: 100 MG/DL — SIGNIFICANT CHANGE UP
TROPONIN T, HIGH SENSITIVITY RESULT: <6 NG/L — SIGNIFICANT CHANGE UP
WBC # BLD: 8.34 K/UL — SIGNIFICANT CHANGE UP (ref 3.8–10.5)
WBC # FLD AUTO: 8.34 K/UL — SIGNIFICANT CHANGE UP (ref 3.8–10.5)

## 2022-07-30 PROCEDURE — 99223 1ST HOSP IP/OBS HIGH 75: CPT

## 2022-07-30 PROCEDURE — 12345: CPT | Mod: NC

## 2022-07-30 RX ORDER — ACETAMINOPHEN 500 MG
2 TABLET ORAL
Qty: 0 | Refills: 0 | DISCHARGE
Start: 2022-07-30

## 2022-07-30 RX ORDER — ASPIRIN/CALCIUM CARB/MAGNESIUM 324 MG
81 TABLET ORAL DAILY
Refills: 0 | Status: DISCONTINUED | OUTPATIENT
Start: 2022-07-30 | End: 2022-07-30

## 2022-07-30 RX ORDER — LEVOTHYROXINE SODIUM 125 MCG
0 TABLET ORAL
Qty: 0 | Refills: 0 | DISCHARGE

## 2022-07-30 RX ORDER — LANOLIN ALCOHOL/MO/W.PET/CERES
3 CREAM (GRAM) TOPICAL AT BEDTIME
Refills: 0 | Status: DISCONTINUED | OUTPATIENT
Start: 2022-07-30 | End: 2022-07-30

## 2022-07-30 RX ORDER — ACETAMINOPHEN 500 MG
650 TABLET ORAL EVERY 6 HOURS
Refills: 0 | Status: DISCONTINUED | OUTPATIENT
Start: 2022-07-30 | End: 2022-07-30

## 2022-07-30 RX ORDER — ENOXAPARIN SODIUM 100 MG/ML
40 INJECTION SUBCUTANEOUS EVERY 24 HOURS
Refills: 0 | Status: DISCONTINUED | OUTPATIENT
Start: 2022-07-30 | End: 2022-07-30

## 2022-07-30 RX ORDER — METFORMIN HYDROCHLORIDE 850 MG/1
0 TABLET ORAL
Qty: 0 | Refills: 0 | DISCHARGE

## 2022-07-30 RX ORDER — ATORVASTATIN CALCIUM 80 MG/1
40 TABLET, FILM COATED ORAL AT BEDTIME
Refills: 0 | Status: DISCONTINUED | OUTPATIENT
Start: 2022-07-30 | End: 2022-07-30

## 2022-07-30 RX ORDER — SODIUM CHLORIDE 9 MG/ML
1000 INJECTION, SOLUTION INTRAVENOUS
Refills: 0 | Status: DISCONTINUED | OUTPATIENT
Start: 2022-07-30 | End: 2022-07-30

## 2022-07-30 RX ORDER — DEXTROSE 50 % IN WATER 50 %
25 SYRINGE (ML) INTRAVENOUS ONCE
Refills: 0 | Status: DISCONTINUED | OUTPATIENT
Start: 2022-07-30 | End: 2022-07-30

## 2022-07-30 RX ORDER — LISINOPRIL 2.5 MG/1
0 TABLET ORAL
Qty: 0 | Refills: 0 | DISCHARGE

## 2022-07-30 RX ORDER — ONDANSETRON 8 MG/1
4 TABLET, FILM COATED ORAL EVERY 8 HOURS
Refills: 0 | Status: DISCONTINUED | OUTPATIENT
Start: 2022-07-30 | End: 2022-07-30

## 2022-07-30 RX ORDER — ATORVASTATIN CALCIUM 80 MG/1
1 TABLET, FILM COATED ORAL
Qty: 30 | Refills: 0
Start: 2022-07-30 | End: 2022-08-28

## 2022-07-30 RX ORDER — GLUCAGON INJECTION, SOLUTION 0.5 MG/.1ML
1 INJECTION, SOLUTION SUBCUTANEOUS ONCE
Refills: 0 | Status: DISCONTINUED | OUTPATIENT
Start: 2022-07-30 | End: 2022-07-30

## 2022-07-30 RX ORDER — INSULIN LISPRO 100/ML
VIAL (ML) SUBCUTANEOUS
Refills: 0 | Status: DISCONTINUED | OUTPATIENT
Start: 2022-07-30 | End: 2022-07-30

## 2022-07-30 RX ORDER — ASPIRIN/CALCIUM CARB/MAGNESIUM 324 MG
162 TABLET ORAL ONCE
Refills: 0 | Status: COMPLETED | OUTPATIENT
Start: 2022-07-30 | End: 2022-07-30

## 2022-07-30 RX ORDER — INSULIN LISPRO 100/ML
VIAL (ML) SUBCUTANEOUS AT BEDTIME
Refills: 0 | Status: DISCONTINUED | OUTPATIENT
Start: 2022-07-30 | End: 2022-07-30

## 2022-07-30 RX ORDER — LISINOPRIL 2.5 MG/1
2.5 TABLET ORAL DAILY
Refills: 0 | Status: DISCONTINUED | OUTPATIENT
Start: 2022-07-30 | End: 2022-07-30

## 2022-07-30 RX ORDER — LEVOTHYROXINE SODIUM 125 MCG
25 TABLET ORAL DAILY
Refills: 0 | Status: DISCONTINUED | OUTPATIENT
Start: 2022-07-30 | End: 2022-07-30

## 2022-07-30 RX ORDER — ATORVASTATIN CALCIUM 80 MG/1
0 TABLET, FILM COATED ORAL
Qty: 0 | Refills: 0 | DISCHARGE

## 2022-07-30 RX ORDER — METFORMIN HYDROCHLORIDE 850 MG/1
1 TABLET ORAL
Qty: 0 | Refills: 0 | DISCHARGE

## 2022-07-30 RX ORDER — DEXTROSE 50 % IN WATER 50 %
12.5 SYRINGE (ML) INTRAVENOUS ONCE
Refills: 0 | Status: DISCONTINUED | OUTPATIENT
Start: 2022-07-30 | End: 2022-07-30

## 2022-07-30 RX ORDER — DEXTROSE 50 % IN WATER 50 %
15 SYRINGE (ML) INTRAVENOUS ONCE
Refills: 0 | Status: DISCONTINUED | OUTPATIENT
Start: 2022-07-30 | End: 2022-07-30

## 2022-07-30 RX ADMIN — Medication 81 MILLIGRAM(S): at 12:33

## 2022-07-30 RX ADMIN — Medication 25 MICROGRAM(S): at 06:51

## 2022-07-30 RX ADMIN — ENOXAPARIN SODIUM 40 MILLIGRAM(S): 100 INJECTION SUBCUTANEOUS at 06:51

## 2022-07-30 RX ADMIN — Medication 1: at 12:30

## 2022-07-30 RX ADMIN — Medication 162 MILLIGRAM(S): at 02:15

## 2022-07-30 NOTE — DISCHARGE NOTE PROVIDER - NSDCMRMEDTOKEN_GEN_ALL_CORE_FT
acetaminophen 325 mg oral tablet: 2 tab(s) orally every 6 hours as needed for pain  LEVOTHYROXINE SODIUM 25MCG TAB:   LISINOPRIL 2.5MG TAB:   METFORMIN HCL 1,000 MG TABLET: 1 each orally once a day   acetaminophen 325 mg oral tablet: 2 tab(s) orally every 6 hours as needed for pain  atorvastatin 40 mg oral tablet: 1 tab(s) orally once a day (at bedtime)  LEVOTHYROXINE SODIUM 25MCG TAB:   LISINOPRIL 2.5MG TAB:   METFORMIN HCL 1,000 MG TABLET: 1 each orally once a day

## 2022-07-30 NOTE — H&P ADULT - NSHPLABSRESULTS_GEN_ALL_CORE
labs reviewed                        13.8   11.91 )-----------( 197      ( 29 Jul 2022 22:30 )             40.6       07-29    137  |  100  |  10  ----------------------------<  88  3.8   |  26  |  0.89    Ca    9.0      29 Jul 2022 22:30  Mg     1.90     07-29    TPro  7.0  /  Alb  4.5  /  TBili  0.4  /  DBili  x   /  AST  16  /  ALT  12  /  AlkPhos  53  07-29        PT/INR - ( 29 Jul 2022 22:30 )   PT: 11.7 sec;   INR: 1.01 ratio         PTT - ( 29 Jul 2022 22:30 )  PTT:31.4 sec    EKG interpreted by myself: NSR with TWI in V1  CXR interpreted by myself: clear lungs  Images interpreted by radiology:   CT brain: No acute intracranial hemorrhage, mass effect, or midline shift.    CTA neck and brain: No large vessel occlusion, hemodynamically significantly stenosis, dissection, or aneurysm.

## 2022-07-30 NOTE — CONSULT NOTE ADULT - ASSESSMENT
Patient is a 47 year old male with PMH of HTN, HLD, DM, presents with right sided pain that radiated to head and then to chest associated with SOB.    PLAN:  EKG with no signs of acute ischemia  Troponin is negative x 2  Chest pain atypical  No further reports of SOB or pain  Pain relieved with morphine  Recommend outpatient follow up with cardiologist in the office for further cardiac work up.  Discussed this case with Dr. Hubert Gonsalez

## 2022-07-30 NOTE — ED ADULT NURSE REASSESSMENT NOTE - NS ED NURSE REASSESS COMMENT FT1
report received from overnight RN. A&Ox4. ambulatory. NAD. pt denies SOB, dizziness, weakness, urinary symptoms, HA, n/v/d, fevers, chills. respirations are even and un labored. skin intact. safety precautions maintained. pt on cardiac monitor, sinus isabel. safety precautions maintained.

## 2022-07-30 NOTE — DISCHARGE NOTE NURSING/CASE MANAGEMENT/SOCIAL WORK - NSDCPEFALRISK_GEN_ALL_CORE
For information on Fall & Injury Prevention, visit: [email or web address] OR  [email or web address] OR  https://www.cdc.gov/steadi/patient.html

## 2022-07-30 NOTE — H&P ADULT - HISTORY OF PRESENT ILLNESS
47 yr old male with a pmh of HTN, HLD, T2DM not on insulin who presents with chest pain that started at ~3:20 pm. Reports the "clicking pain" started on the right side in his neck and traveled both up his head as well as into the right side of his chest. This was associated with SOB.  Reports the pain was 10/10 and has improved with the morphine.   Reports extensive family history of MI, HTN, T2DM.  He has been under more stress at home lately/ has an increased work load as he has a  at home that he has been taking care of as well as his wife who is sick.   Denies  headache, dizziness, palpitations,  abdominal pain, joint pain, diarrhea/constipation, urinary symptoms.   Vitals: T 98.2, HR 72, BP 97/62, RR 15 satting 100% RA

## 2022-07-30 NOTE — H&P ADULT - PROBLEM SELECTOR PLAN 1
New  - EKG nonischemic and troponin <6  - FLAKO score 1, HEART score 4  * repeat troponin  * monitor on telemetry  * cardiology consult in AM   -Start aspirin and increase atorvastatin to 40mg daily

## 2022-07-30 NOTE — DISCHARGE NOTE PROVIDER - NSDCCPCAREPLAN_GEN_ALL_CORE_FT
PRINCIPAL DISCHARGE DIAGNOSIS  Diagnosis: Chest pain  Assessment and Plan of Treatment: You were admitted to the hospital with chest pain.  Your EKG was normal; your bloodwork showed no signs of heart attack or blood clots. Your pain is likely musculoskeletal in nature, take Tylenol 650 mg every 6 hours as needed for pain.  You were seen by the cardiology team who recommends outpatient follow up with cardiology for further workup; call [phone] to schedule appointment.  Follow up with your primary care doctor within 1 week of discharge.      SECONDARY DISCHARGE DIAGNOSES  Diagnosis: Benign essential HTN  Assessment and Plan of Treatment: You have a history of high blood pressure.  Please continue medications as currently prescribed.  Please continue low salt, low cholesterol (DASH) diet. Follow up with your primary care doctor for further management.    Diagnosis: HLD (hyperlipidemia)  Assessment and Plan of Treatment: You have a history of high cholesterol.  Your ATORVASTATIN dose was INCREASED.  Please continue medications as currently prescribed.  Please continue low salt, low cholesterol (DASH) diet. Follow up with your primary care doctor for further management.    Diagnosis: Hypothyroid  Assessment and Plan of Treatment: You have a history of abnormal thyroid function.  Your TSH was 2.22.  Please continue your Synthroid as currently prescribed.  Follow up with your primary care doctor for further management.    Diagnosis: T2DM (type 2 diabetes mellitus)  Assessment and Plan of Treatment: Your A1C was 5.7.  Continue your medication regimen and a consistent carbohydrate diet (Meaning eating the same amount of carbohydrates at the same time each day). Monitor blood glucose levels throughout the day before meals and at bedtime. Record blood sugars and bring to outpatient providers appointment in order to be reviewed by your doctor for management modifications. If your sugars are more than 400 or less than 70 you should contact your PCP immediately. Monitor for signs/symptoms of low blood glucose, such as, dizziness, altered mental status, or cool/clammy skin. In addition, monitor for signs/symptoms of high blood glucose, such as, feeling hot, dry, fatigued, or with increased thirst/urination. Make regular podiatry appointments in order to have feet checked for wounds and uncontrolled toe nail growth to prevent infections, as well as, appointments with an ophthalmologist to monitor your vision.

## 2022-07-30 NOTE — ED ADULT NURSE REASSESSMENT NOTE - NS ED NURSE REASSESS COMMENT FT1
report given to CDU TREVON Stewart. BELINDA. pt denies SOB, chest pain, dizziness, weakness, urinary symptoms, HA, n/v/d, fevers, chills. A&Ox4. respirations are even and un labored. safety precautions maintained.

## 2022-07-30 NOTE — PROGRESS NOTE ADULT - SUBJECTIVE AND OBJECTIVE BOX
[facility] Division of Hospital Medicine  Maria Elena Arauz MD  Pager (M-F, 8A-5P): [phone]  Other Times:  [phone]    Patient is a 47y old  Male who presents with a chief complaint of chest pain (30 Jul 2022 03:30)      SUBJECTIVE / OVERNIGHT EVENTS: still with pleuritic CP on inspiration. headache now resolved. tele NSR  ADDITIONAL REVIEW OF SYSTEMS: denies SOB/N/V     MEDICATIONS  (STANDING):  aspirin  chewable 81 milliGRAM(s) Oral daily  atorvastatin 40 milliGRAM(s) Oral at bedtime  dextrose 5%. 1000 milliLiter(s) (50 mL/Hr) IV Continuous <Continuous>  dextrose 5%. 1000 milliLiter(s) (100 mL/Hr) IV Continuous <Continuous>  dextrose 50% Injectable 25 Gram(s) IV Push once  dextrose 50% Injectable 12.5 Gram(s) IV Push once  dextrose 50% Injectable 25 Gram(s) IV Push once  enoxaparin Injectable 40 milliGRAM(s) SubCutaneous every 24 hours  glucagon  Injectable 1 milliGRAM(s) IntraMuscular once  insulin lispro (ADMELOG) corrective regimen sliding scale   SubCutaneous three times a day before meals  insulin lispro (ADMELOG) corrective regimen sliding scale   SubCutaneous at bedtime  levothyroxine 25 MICROGram(s) Oral daily  lisinopril 2.5 milliGRAM(s) Oral daily    MEDICATIONS  (PRN):  acetaminophen     Tablet .. 650 milliGRAM(s) Oral every 6 hours PRN Temp greater or equal to 38C (100.4F), Mild Pain (1 - 3)  aluminum hydroxide/magnesium hydroxide/simethicone Suspension 30 milliLiter(s) Oral every 4 hours PRN Dyspepsia  dextrose Oral Gel 15 Gram(s) Oral once PRN Blood Glucose LESS THAN 70 milliGRAM(s)/deciliter  melatonin 3 milliGRAM(s) Oral at bedtime PRN Insomnia  ondansetron Injectable 4 milliGRAM(s) IV Push every 8 hours PRN Nausea and/or Vomiting      CAPILLARY BLOOD GLUCOSE      POCT Blood Glucose.: 176 mg/dL (30 Jul 2022 12:18)  POCT Blood Glucose.: 99 mg/dL (30 Jul 2022 08:40)  POCT Blood Glucose.: 95 mg/dL (30 Jul 2022 06:36)  POCT Blood Glucose.: 111 mg/dL (29 Jul 2022 19:26)    I&O's Summary      PHYSICAL EXAM:  Vital Signs Last 24 Hrs  T(C): 36.6 (30 Jul 2022 10:21), Max: 36.8 (29 Jul 2022 23:52)  T(F): 97.8 (30 Jul 2022 10:21), Max: 98.2 (29 Jul 2022 23:52)  HR: 67 (30 Jul 2022 10:21) (64 - 81)  BP: 111/63 (30 Jul 2022 10:21) (93/64 - 111/63)  BP(mean): --  RR: 15 (30 Jul 2022 10:21) (15 - 16)  SpO2: 98% (30 Jul 2022 10:21) (98% - 100%)    Parameters below as of 30 Jul 2022 10:21  Patient On (Oxygen Delivery Method): room air      CONSTITUTIONAL: NAD  EYES: PERRLA; conjunctiva and sclera clear  ENMT: Moist oral mucosa, no pharyngeal injection or exudates; normal dentition  NECK: Supple, no palpable masses; no thyromegaly  RESPIRATORY: Normal respiratory effort; lungs are clear to auscultation bilaterally  CARDIOVASCULAR: Regular rate and rhythm, normal S1 and S2, no murmur/rub/gallop; No lower extremity edema; Peripheral pulses are 2+ bilaterally  ABDOMEN: Nontender to palpation, normoactive bowel sounds, no rebound/guarding; No hepatosplenomegaly  MUSCULOSKELETAL:  Normal gait; no clubbing or cyanosis of digits; no joint swelling or tenderness to palpation  PSYCH: A+O to person, place, and time; affect appropriate  NEUROLOGY: CN 2-12 are intact and symmetric; no gross sensory deficits       LABS:                        12.4   8.34  )-----------( 177      ( 30 Jul 2022 06:13 )             36.9     07-30    141  |  106  |  9   ----------------------------<  94  3.8   |  25  |  0.81    Ca    8.2<L>      30 Jul 2022 06:13  Mg     1.90     07-29    TPro  7.0  /  Alb  4.5  /  TBili  0.4  /  DBili  x   /  AST  16  /  ALT  12  /  AlkPhos  53  07-29    PT/INR - ( 29 Jul 2022 22:30 )   PT: 11.7 sec;   INR: 1.01 ratio         PTT - ( 29 Jul 2022 22:30 )  PTT:31.4 sec            RADIOLOGY & ADDITIONAL TESTS:  Results Reviewed: ct< from: CT Angio Head w/ IV Cont (07.29.22 @ 23:37) >  IMPRESSION:    CT brain: No acute intracranial hemorrhage, mass effect, or midline shift.    CTA neck and brain: No large vessel occlusion, hemodynamically   significantly stenosis, dissection, or aneurysm.    --- End of Report ---    < end of copied text >  < from: CT Angio Neck w/ IV Cont (07.29.22 @ 23:38) >  IMPRESSION:    CT brain: No acute intracranial hemorrhage, mass effect, or midline shift.    CTA neck and brain: No large vessel occlusion, hemodynamically   significantly stenosis, dissection, or aneurysm.    --- End of Report ---      < end of copied text >    Imaging Personally Reviewed:  Electrocardiogram Personally Reviewed:    COORDINATION OF CARE:  Care Discussed with Consultants/Other Providers [Y/N]:  Prior or Outpatient Records Reviewed [Y/N]:

## 2022-07-30 NOTE — H&P ADULT - NSHPREVIEWOFSYSTEMS_GEN_ALL_CORE
REVIEW OF SYSTEMS:    CONSTITUTIONAL: No weakness, fevers or chills  EYES/ENT: No visual changes;  No dysphagia; No sore throat; No rhinorrhea; No sinus pain/pressure  NECK: No pain or stiffness  RESPIRATORY: No cough, wheezing, hemoptysis; + shortness of breath  CARDIOVASCULAR: + chest pain no  palpitations; No lower extremity edema  GASTROINTESTINAL: No abdominal or epigastric pain. No nausea, vomiting, or hematemesis; No diarrhea or constipation. No melena or hematochezia.  GENITOURINARY: No dysuria, frequency or hematuria  NEUROLOGICAL: No numbness or weakness  MSK: ambulates without assistance   SKIN: No itching, burning, rashes, or lesions   All other review of systems is negative unless indicated above.

## 2022-07-30 NOTE — DISCHARGE NOTE NURSING/CASE MANAGEMENT/SOCIAL WORK - NSDCFUADDAPPT_GEN_ALL_CORE_FT
Follow up with your primary care doctor within one week of discharge. If you do not have one, you can call the medicine clinic at [phone] to make an appointment.    Follow up with Cardiology, Dr. Gonsalez, within 1-2 weeks of discharge, please call [phone] to schedule appointment.

## 2022-07-30 NOTE — DISCHARGE NOTE PROVIDER - CARE PROVIDER_API CALL
Your Primary Care Doctor,   Phone: (   )    -  Fax: (   )    -  Follow Up Time:     Hubert Gonsalez (MD; PhD)  Cardiology; Internal Medicine; Vascular Medicine  [id number] [place] [address], [address]  [place] [address]  Phone: [phone]  Fax: [phone]  Follow Up Time:

## 2022-07-30 NOTE — H&P ADULT - PROBLEM SELECTOR PLAN 3
Chronic stable  In setting of chest pain will increase atorvastatin to 40mg daily whilst awaiting above results

## 2022-07-30 NOTE — DISCHARGE NOTE PROVIDER - HOSPITAL COURSE
46 yo M w/ hx DM p/w chest pain.  EKG nonischemic and troponin <6 x 2, no events on telemetry, D-Dimer <150. Cardiology consulted, recommends outpatient follow up with cardiologist in the office for further cardiac work up. Statin increased at discharge, no need for ASA at this time given no history of CAD.  Pain likely MSK in nature, Tylenol PRN for pain.  Follow up with PCP and Cardiology outpatient.    Patient seen and evaluated. Reviewed discharge medications with patient and attending. All new medications requiring new prescriptions were sent to the pharmacy of patient's choice. Reviewed need for prescription for previous home medications and new prescriptions sent if requested. Medically cleared/stable for discharge as per Dr. Arauz on 6/30/2022 with appropriate follow up. Patient understands and agrees with plan of care.

## 2022-07-30 NOTE — DISCHARGE NOTE PROVIDER - PROVIDER TOKENS
FREE:[LAST:[Your Primary Care Doctor],PHONE:[(   )    -],FAX:[(   )    -]],PROVIDER:[TOKEN:[[id number]]]

## 2022-07-30 NOTE — DISCHARGE NOTE PROVIDER - CARE PROVIDERS DIRECT ADDRESSES
,DirectAddress_Unknown,[email or web address]

## 2022-07-30 NOTE — PROGRESS NOTE ADULT - PROBLEM SELECTOR PLAN 1
- EKG nonischemic and troponin <6 x 2   - ASA/statin  * monitor on telemetry  * cardiology consult   - check D-dimer   - check TTE

## 2022-07-30 NOTE — DISCHARGE NOTE NURSING/CASE MANAGEMENT/SOCIAL WORK - PATIENT PORTAL LINK FT
You can access the FollowMyHealth Patient Portal offered by [facility] by registering at the following website: [email or web address]. By joining [employer]s FollowMyHealth portal, you will also be able to view your health information using other applications (apps) compatible with our system.

## 2022-07-30 NOTE — DISCHARGE NOTE PROVIDER - NSDCFUADDAPPT_GEN_ALL_CORE_FT
Follow up with your primary care doctor within one week of discharge. If you do not have one, you can call the medicine clinic at [phone] to make an appointment.    Follow up with Cardiology, Dr. Gonsalez, within 1-2 weeks of discharge, please call [phone] to schedule appointment.

## 2022-07-30 NOTE — CONSULT NOTE ADULT - NS ATTEND AMEND GEN_ALL_CORE FT
Patient seen and examined during morning rounds.  Assessment and recommendations were reviewed with the CCU NP, and as outlined above.

## 2022-07-30 NOTE — CONSULT NOTE ADULT - SUBJECTIVE AND OBJECTIVE BOX
Date of Admission:  22  CHIEF COMPLAINT:  right-sided chest pain  HISTORY OF PRESENT ILLNESS:  Patient is a 47 year old male with PMH of HTN, HLD, DM, +family history of CAD and MI, presents with right sided chest pain.  Patient described a clicking pain" started on the right side in his neck and traveled both up his head as well as into the right side of his chest. This was associated with SOB.  Reports the pain was 10/10 and has improved with the morphine.     He has been under more stress at home lately/ has an increased work load as he has a  at home that he has been taking care of as well as his wife who is sick.   Denies  headache, dizziness, palpitations,  abdominal pain, joint pain, diarrhea/constipation, urinary symptoms.       Cardiology consulted for chest pain.    Allergies    No Known Allergies    Intolerances    	    MEDICATIONS:  aspirin  chewable 81 milliGRAM(s) Oral daily  enoxaparin Injectable 40 milliGRAM(s) SubCutaneous every 24 hours  lisinopril 2.5 milliGRAM(s) Oral daily  acetaminophen     Tablet .. 650 milliGRAM(s) Oral every 6 hours PRN  melatonin 3 milliGRAM(s) Oral at bedtime PRN  ondansetron Injectable 4 milliGRAM(s) IV Push every 8 hours PRN  aluminum hydroxide/magnesium hydroxide/simethicone Suspension 30 milliLiter(s) Oral every 4 hours PRN    atorvastatin 40 milliGRAM(s) Oral at bedtime  dextrose 50% Injectable 25 Gram(s) IV Push once  dextrose 50% Injectable 12.5 Gram(s) IV Push once  dextrose 50% Injectable 25 Gram(s) IV Push once  dextrose Oral Gel 15 Gram(s) Oral once PRN  glucagon  Injectable 1 milliGRAM(s) IntraMuscular once  insulin lispro (ADMELOG) corrective regimen sliding scale   SubCutaneous three times a day before meals  insulin lispro (ADMELOG) corrective regimen sliding scale   SubCutaneous at bedtime  levothyroxine 25 MICROGram(s) Oral daily    dextrose 5%. 1000 milliLiter(s) IV Continuous <Continuous>  dextrose 5%. 1000 milliLiter(s) IV Continuous <Continuous>      PAST MEDICAL & SURGICAL HISTORY:  HTN (hypertension)      HLD (hyperlipidemia)      DM (diabetes mellitus)      No significant past surgical history          FAMILY HISTORY:  FH: MI (myocardial infarction) (Father)        SOCIAL HISTORY:    [ ] Non-smoker  [ ] Smoker  [ ] Alcohol      REVIEW OF SYSTEMS:  See HPI. Otherwise, 10 point ROS done and otherwise negative.      T(C): 36.6 (22 @ 10:21), Max: 36.8 (22 @ 23:52)  HR: 67 (22 @ 10:21) (64 - 81)  BP: 111/63 (22 @ 10:21) (93/64 - 111/63)  RR: 15 (22 @ 10:21) (15 - 16)  SpO2: 98% (22 @ 10:21) (98% - 100%)  Wt(kg): --  I&O's Summary      Physical Exam:  General: NAD  Cardiovascular: Normal S1 S2, No JVD, No murmurs, No edema  Respiratory: Lungs clear to auscultation	  Gastrointestinal:  Soft, Non-tender, + BS	  Skin: warm and dry, No rashes, No ecchymoses, No cyanosis	  Extremities:  No clubbing, cyanosis or edema  Vascular: Peripheral pulses palpable 2+ bilaterally    LABS:	   	    CBC Full  -  ( 2022 06:13 )  WBC Count : 8.34 K/uL  Hemoglobin : 12.4 g/dL  Hematocrit : 36.9 %  Platelet Count - Automated : 177 K/uL  Mean Cell Volume : 90.9 fL  Mean Cell Hemoglobin : 30.5 pg  Mean Cell Hemoglobin Concentration : 33.6 gm/dL  Auto Neutrophil # : 4.59 K/uL  Auto Lymphocyte # : 2.78 K/uL  Auto Monocyte # : 0.72 K/uL  Auto Eosinophil # : 0.21 K/uL  Auto Basophil # : 0.02 K/uL  Auto Neutrophil % : 55.2 %  Auto Lymphocyte % : 33.3 %  Auto Monocyte % : 8.6 %  Auto Eosinophil % : 2.5 %  Auto Basophil % : 0.2 %        141  |  106  |  9   ----------------------------<  94  3.8   |  25  |  0.81      137  |  100  |  10  ----------------------------<  88  3.8   |  26  |  0.89    Ca    8.2<L>      2022 06:13  Ca    9.0      2022 22:30  Mg     1.90         TPro  7.0  /  Alb  4.5  /  TBili  0.4  /  DBili  x   /  AST  16  /  ALT  12  /  AlkPhos  53        proBNP: Serum Pro-Brain Natriuretic Peptide: <5 pg/mL ( @ 22:30)    Lipid Profile:   HgA1c:   TSH: Thyroid Stimulating Hormone, Serum: 2.22 uIU/mL ( @ 22:30)    TROPONIN:  less than 6 delta less than 6

## 2022-08-01 PROBLEM — Z00.00 ENCOUNTER FOR PREVENTIVE HEALTH EXAMINATION: Status: ACTIVE | Noted: 2022-08-01

## 2022-08-29 DIAGNOSIS — E11.9 TYPE 2 DIABETES MELLITUS W/OUT COMPLICATIONS: ICD-10-CM

## 2022-08-29 DIAGNOSIS — Z78.9 OTHER SPECIFIED HEALTH STATUS: ICD-10-CM

## 2022-08-29 DIAGNOSIS — Z82.49 FAMILY HISTORY OF ISCHEMIC HEART DISEASE AND OTHER DISEASES OF THE CIRCULATORY SYSTEM: ICD-10-CM

## 2022-08-29 DIAGNOSIS — E03.9 HYPOTHYROIDISM, UNSPECIFIED: ICD-10-CM

## 2022-09-01 ENCOUNTER — NON-APPOINTMENT (OUTPATIENT)
Age: 47
End: 2022-09-01

## 2022-09-01 ENCOUNTER — TRANSCRIPTION ENCOUNTER (OUTPATIENT)
Age: 47
End: 2022-09-01

## 2022-09-01 ENCOUNTER — APPOINTMENT (OUTPATIENT)
Dept: CARDIOLOGY | Facility: CLINIC | Age: 47
End: 2022-09-01

## 2022-09-01 VITALS
OXYGEN SATURATION: 97 % | HEIGHT: 64 IN | HEART RATE: 66 BPM | BODY MASS INDEX: 22.31 KG/M2 | DIASTOLIC BLOOD PRESSURE: 76 MMHG | TEMPERATURE: 98.5 F | SYSTOLIC BLOOD PRESSURE: 118 MMHG

## 2022-09-01 VITALS — WEIGHT: 130 LBS

## 2022-09-01 DIAGNOSIS — R07.9 CHEST PAIN, UNSPECIFIED: ICD-10-CM

## 2022-09-01 DIAGNOSIS — E78.5 HYPERLIPIDEMIA, UNSPECIFIED: ICD-10-CM

## 2022-09-01 DIAGNOSIS — I10 ESSENTIAL (PRIMARY) HYPERTENSION: ICD-10-CM

## 2022-09-01 DIAGNOSIS — Z13.6 ENCOUNTER FOR SCREENING FOR CARDIOVASCULAR DISORDERS: ICD-10-CM

## 2022-09-01 PROCEDURE — 93000 ELECTROCARDIOGRAM COMPLETE: CPT

## 2022-09-01 PROCEDURE — 99214 OFFICE O/P EST MOD 30 MIN: CPT | Mod: 25

## 2022-09-01 RX ORDER — LISINOPRIL 2.5 MG/1
2.5 TABLET ORAL
Refills: 0 | Status: ACTIVE | COMMUNITY

## 2022-09-01 RX ORDER — ATORVASTATIN CALCIUM 40 MG/1
40 TABLET, FILM COATED ORAL AT BEDTIME
Refills: 0 | Status: ACTIVE | COMMUNITY

## 2022-09-01 RX ORDER — METFORMIN HYDROCHLORIDE 1000 MG/1
1000 TABLET, COATED ORAL DAILY
Refills: 0 | Status: ACTIVE | COMMUNITY

## 2022-09-01 RX ORDER — LEVOTHYROXINE SODIUM 0.03 MG/1
25 TABLET ORAL
Refills: 0 | Status: ACTIVE | COMMUNITY

## 2023-09-06 NOTE — ED ADULT TRIAGE NOTE - ESI TRIAGE ACUITY LEVEL, MLM
Reason for Call:  Appointment Request    Patient requesting this type of appt:  sore throat and cough     Requested provider: FLORENCIO Vides [facility]    Reason patient unable to be scheduled: Not within requested timeframe    When does patient want to be seen/preferred time: Same day    Comments: patient would like to be seen today 9/6.     [facility] or would you prefer to receive a phone call?:   Patient would prefer a phone call   Okay to leave a detailed message?: Yes at Cell number on file:    Telephone Information:   Mobile [phone]       Call taken on 9/6/2023 at 7:52 AM by Nona London   4

## 2024-05-31 NOTE — ED PROVIDER NOTE - NSICDXNOFAMILYHX_GEN_ALL_ED
Hpi Title: Evaluation of Skin Lesions Additional History: \\n\\nEst pt, new to \\nFBSE\\nConcerned about some new moles on the body, pt will point them out during skin check\\nNo hx <-- Click to add NO pertinent Family History